# Patient Record
Sex: FEMALE | Race: BLACK OR AFRICAN AMERICAN | Employment: PART TIME | URBAN - METROPOLITAN AREA
[De-identification: names, ages, dates, MRNs, and addresses within clinical notes are randomized per-mention and may not be internally consistent; named-entity substitution may affect disease eponyms.]

---

## 2018-02-15 ENCOUNTER — APPOINTMENT (OUTPATIENT)
Dept: ULTRASOUND IMAGING | Age: 23
End: 2018-02-15
Attending: NURSE PRACTITIONER
Payer: SELF-PAY

## 2018-02-15 ENCOUNTER — HOSPITAL ENCOUNTER (EMERGENCY)
Age: 23
Discharge: HOME | End: 2018-02-15
Attending: EMERGENCY MEDICINE
Payer: SELF-PAY

## 2018-02-15 VITALS
WEIGHT: 205 LBS | OXYGEN SATURATION: 100 % | SYSTOLIC BLOOD PRESSURE: 115 MMHG | RESPIRATION RATE: 20 BRPM | BODY MASS INDEX: 31.63 KG/M2 | HEART RATE: 60 BPM | DIASTOLIC BLOOD PRESSURE: 65 MMHG | TEMPERATURE: 98.4 F

## 2018-02-15 DIAGNOSIS — N83.202 CYSTS OF BOTH OVARIES: Primary | ICD-10-CM

## 2018-02-15 DIAGNOSIS — N83.201 CYSTS OF BOTH OVARIES: Primary | ICD-10-CM

## 2018-02-15 LAB
APPEARANCE UR: CLEAR
BILIRUB UR QL: NEGATIVE
COLOR UR: YELLOW
GLUCOSE UR STRIP.AUTO-MCNC: NEGATIVE MG/DL
HCG UR QL: NEGATIVE
HGB UR QL STRIP: NEGATIVE
KETONES UR QL STRIP.AUTO: NEGATIVE MG/DL
LEUKOCYTE ESTERASE UR QL STRIP.AUTO: NEGATIVE
NITRITE UR QL STRIP.AUTO: NEGATIVE
PH UR STRIP: 6.5 [PH] (ref 5–8)
PROT UR STRIP-MCNC: NEGATIVE MG/DL
SERVICE CMNT-IMP: NORMAL
SP GR UR REFRACTOMETRY: 1.02 (ref 1–1.03)
UROBILINOGEN UR QL STRIP.AUTO: 0.2 EU/DL (ref 0.2–1)
WET PREP GENITAL: NORMAL

## 2018-02-15 PROCEDURE — 74011250637 HC RX REV CODE- 250/637: Performed by: NURSE PRACTITIONER

## 2018-02-15 PROCEDURE — 87491 CHLMYD TRACH DNA AMP PROBE: CPT | Performed by: NURSE PRACTITIONER

## 2018-02-15 PROCEDURE — 81025 URINE PREGNANCY TEST: CPT

## 2018-02-15 PROCEDURE — 76856 US EXAM PELVIC COMPLETE: CPT

## 2018-02-15 PROCEDURE — 81003 URINALYSIS AUTO W/O SCOPE: CPT | Performed by: NURSE PRACTITIONER

## 2018-02-15 PROCEDURE — 99284 EMERGENCY DEPT VISIT MOD MDM: CPT

## 2018-02-15 PROCEDURE — 87210 SMEAR WET MOUNT SALINE/INK: CPT | Performed by: NURSE PRACTITIONER

## 2018-02-15 RX ORDER — IBUPROFEN 600 MG/1
600 TABLET ORAL
Qty: 20 TAB | Refills: 0 | Status: SHIPPED | OUTPATIENT
Start: 2018-02-15

## 2018-02-15 RX ORDER — IBUPROFEN 600 MG/1
600 TABLET ORAL
Status: COMPLETED | OUTPATIENT
Start: 2018-02-15 | End: 2018-02-15

## 2018-02-15 RX ORDER — HYDROCODONE BITARTRATE AND ACETAMINOPHEN 5; 325 MG/1; MG/1
1 TABLET ORAL
Status: COMPLETED | OUTPATIENT
Start: 2018-02-15 | End: 2018-02-15

## 2018-02-15 RX ORDER — HYDROCODONE BITARTRATE AND ACETAMINOPHEN 5; 325 MG/1; MG/1
1 TABLET ORAL
Qty: 12 TAB | Refills: 0 | Status: SHIPPED | OUTPATIENT
Start: 2018-02-15

## 2018-02-15 RX ADMIN — HYDROCODONE BITARTRATE AND ACETAMINOPHEN 1 TABLET: 5; 325 TABLET ORAL at 21:02

## 2018-02-15 RX ADMIN — IBUPROFEN 600 MG: 600 TABLET, FILM COATED ORAL at 21:02

## 2018-02-15 NOTE — LETTER
[facility] EMERGENCY DEPT 
[facility] [address] [id number] 
[phone] Work/School Note Date: 2/15/2018 To Whom It May concern: 
 
Desma Galeazzi was seen and treated today in the emergency room by the following provider(s): 
Attending Provider: Jeanine Ford MD 
Nurse Practitioner: Darius Singh NP. Desma Galeazzi may return to work on 02/17/2018. Sincerely, Darius Singh NP

## 2018-02-15 NOTE — LETTER
[facility] EMERGENCY DEPT 
[facility] [phone] 
[phone] Work/School Note Date: 2/15/2018 To Whom It May concern: 
 
Javad Easley was seen and treated today in the emergency room by the following provider(s): 
Attending Provider: Clive Delcid MD 
Nurse Practitioner: Caterina Galvin NP. Javad Easley may return to work on 02/16/2018. Sincerely, Caterina Galvin NP 
 
 
 
 no blurred vision/no fever/no loss of consciousness/no nausea/no numbness/no syncope/no vomiting/no weakness/no change in level of consciousness/no chills

## 2018-02-16 LAB
C TRACH RRNA SPEC QL NAA+PROBE: NEGATIVE
N GONORRHOEA RRNA SPEC QL NAA+PROBE: NEGATIVE
SPECIMEN SOURCE: NORMAL

## 2018-02-16 NOTE — ED TRIAGE NOTES
C/o pelvic pain after having sex 1 hour ago. \"I think I have a cyst rupture cause this happened before. \"

## 2018-02-16 NOTE — DISCHARGE INSTRUCTIONS
Ruptured Ovarian Cyst: Care Instructions  Your Care Instructions    Sometimes a sac forms on the surface of a woman's ovary. When the sac swells up with fluid, it forms a cyst. If the cyst breaks open, it is called a ruptured ovarian cyst. Sometimes a cyst may rupture and then form again. Sometimes a cyst may partly break open. Blood and fluid can spill out into the lower belly and pelvis. You may not have symptoms from the cyst. But if it is large, or if it twists or bleeds, you may have pain or other problems. You may feel pain because the fluid irritates the pelvis. Your doctor may use a pelvic ultrasound to see if you have a cyst. Your doctor may also do blood tests. Treatment depends on your symptoms. If they are mild, your doctor may suggest carefully watching your symptoms. But if you have a cyst that is very large, bleeds a lot, or causes other problems, your doctor may suggest surgery to remove it. Follow-up care is a key part of your treatment and safety. Be sure to make and go to all appointments, and call your doctor if you are having problems. It's also a good idea to know your test results and keep a list of the medicines you take. How can you care for yourself at home? · Use heat, such as a warm water bottle, a heating pad set on low, or a warm bath, to relax tense muscles and relieve cramping. · Be safe with medicines. Read and follow all instructions on the label. ¨ If the doctor gave you a prescription medicine for pain, take it as prescribed. ¨ If you are not taking a prescription pain medicine, ask your doctor if you can take an over-the-counter medicine. When should you call for help? Call 911 anytime you think you may need emergency care. For example, call if:  ? · You passed out (lost consciousness). ?Call your doctor now or seek immediate medical care if:  ? · You have severe vaginal bleeding. ? · You are dizzy or lightheaded, or you feel like you may faint.    ? · You have new or worse pain in your belly or pelvis. ? Watch closely for changes in your health, and be sure to contact your doctor if:  ? · You think you may be pregnant. ? · You do not get better as expected. Where can you learn more? Go to http://jayden-heather.info/. Enter U942 in the search box to learn more about \"Ruptured Ovarian Cyst: Care Instructions. \"  Current as of: October 13, 2016  Content Version: 11.4  © 4035-5458 [employer]. Care instructions adapted under license by [employer] (which disclaims liability or warranty for this information). If you have questions about a medical condition or this instruction, always ask your healthcare professional. [address] any warranty or liability for your use of this information. Hemorrhagic Ovarian Cyst: Care Instructions  Your Care Instructions    Sometimes a sac forms on the surface of a woman's ovary. When the sac swells up with fluid, it forms a cyst. If the cyst bleeds, it is called a hemorrhagic (say \"eso-ncm-YF-jick\") ovarian cyst. If the cyst breaks open, blood and fluid spill out into the lower belly and pelvis. You may not have symptoms from the cyst. But if it is large, or if it twists or breaks open, you may have pain or other problems. You may feel pain from the cyst or have symptoms from losing blood. Your doctor may use a pelvic ultrasound to see if you have a cyst. Blood tests can help your doctor tell if the cyst is bleeding or you have lost a lot of blood. Treatment depends on your symptoms. If they are mild, your doctor may suggest carefully watching your symptoms and doing blood tests again. But if you have a cyst that is very large, bleeds a lot, or causes other problems, your doctor may suggest surgery to remove it. If the bleeding is heavy, you may also need treatment to replace the blood. Follow-up care is a key part of your treatment and safety.  Be sure to make and go to all appointments, and call your doctor if you are having problems. It's also a good idea to know your test results and keep a list of the medicines you take. How can you care for yourself at home? · Use heat, such as a warm water bottle, a heating pad set on low, or a warm bath, to relax tense muscles and relieve cramping. · Be safe with medicines. Read and follow all instructions on the label. ¨ If the doctor gave you a prescription medicine for pain, take it as prescribed. ¨ If you are not taking a prescription pain medicine, ask your doctor if you can take an over-the-counter medicine. When should you call for help? Call 911 anytime you think you may need emergency care. For example, call if:  ? · You passed out (lost consciousness). ?Call your doctor now or seek immediate medical care if:  ? · You have severe vaginal bleeding. ? · You are dizzy or lightheaded, or you feel like you may faint. ? · You have new or worse pain in your belly or pelvis. ? Watch closely for changes in your health, and be sure to contact your doctor if:  ? · You think you may be pregnant. ? · You do not get better as expected. Where can you learn more? Go to http://jayden-heather.info/. Enter D256 in the search box to learn more about \"Hemorrhagic Ovarian Cyst: Care Instructions. \"  Current as of: October 13, 2016  Content Version: 11.4  © 6582-6678 [employer]. Care instructions adapted under license by [employer] (which disclaims liability or warranty for this information). If you have questions about a medical condition or this instruction, always ask your healthcare professional. [address] any warranty or liability for your use of this information.

## 2018-02-16 NOTE — ED PROVIDER NOTES
HPI Comments: 7:55 PM   20 y.o. female presents to ED C/O pelvic pain. Patient has a HX of hypothyroid and ovarian cyst.  Patient reports sudden onset of pelvic pain 1 hour prior to arrival after having intercrosue. atient took motrin which didn't help, constant ache with intermittent sharp pelvic pain which cause patient to feel nauseas. Patient reports pelvic pain is worse with movement as well.   patient denies radiation of pain, vag bleeding, fever, vag discharge, CP, SOB. Patient reports similar pain in the past with ruptured cyst.  Patient is a nonsmoker/      LMP 02/07. Patient denies any other symptoms or complaints. The history is provided by the patient. History limited by: no language barrier. Past Medical History:   Diagnosis Date    Ill-defined condition     hypothyroid       History reviewed. No pertinent surgical history. History reviewed. No pertinent family history. Social History     Social History    Marital status: SINGLE     Spouse name: N/A    Number of children: N/A    Years of education: N/A     Occupational History    Not on file. Social History Main Topics    Smoking status: Never Smoker    Smokeless tobacco: Not on file    Alcohol use Yes      Comment: occ    Drug use: No    Sexual activity: Not on file     Other Topics Concern    Not on file     Social History Narrative         ALLERGIES: Review of patient's allergies indicates no known allergies. Review of Systems   Constitutional: Negative for appetite change and fever. HENT: Negative for congestion, rhinorrhea and sore throat. Respiratory: Negative for cough, shortness of breath and wheezing. Cardiovascular: Negative for chest pain and leg swelling. Gastrointestinal: Positive for abdominal pain and nausea. Negative for constipation, diarrhea and vomiting. Genitourinary: Positive for pelvic pain. Negative for dysuria. Musculoskeletal: Negative for arthralgias and back pain. Neurological: Negative for dizziness, syncope and headaches. All other systems reviewed and are negative. Vitals:    02/15/18 1954   BP: 135/86   Pulse: 69   Resp: 16   Temp: 98.4 °F (36.9 °C)   SpO2: 97%   Weight: 93 kg (205 lb)            Physical Exam   Constitutional: She is oriented to person, place, and time. She appears well-developed and well-nourished. Patient appears uncomfortable. HENT:   Head: Atraumatic. Eyes: Conjunctivae and EOM are normal.   Cardiovascular: Normal rate, regular rhythm and normal heart sounds. Pulmonary/Chest: Effort normal and breath sounds normal. No respiratory distress. She has no wheezes. She has no rales. Abdominal: Normal appearance and bowel sounds are normal. There is tenderness in the right lower quadrant, suprapubic area and left lower quadrant. There is no rigidity, no rebound, no guarding and no CVA tenderness. Genitourinary:   Genitourinary Comments: Please see pelvic exam   Musculoskeletal: Normal range of motion. Neurological: She is alert and oriented to person, place, and time. She exhibits normal muscle tone. Coordination normal.   Skin: Skin is warm and dry. No rash noted. She is not diaphoretic. No erythema. No pallor. Nursing note and vitals reviewed.        MDM  Number of Diagnoses or Management Options  Cysts of both ovaries:   Diagnosis management comments: Differential Diagnosis: appendicitis, ectopic pregnancy, normal pregnancy, TOA, PID, ovarian cyst, endometriosis, endometritis, uterine fibroids, constipation, gastroenteritis, IBS, IBD, celiac disease, diverticulitis, nephrolithiasis, pyelonephritis, cystitis, mesenteric ischemia, mesenteric adenitis, ruptured AAA, SBO, LBO    Plan:  UA, HCG, pelvic exam, pelvic ultrasound --concern for torsion due to worsening intermittent pain, sudden onset, adnexal pain on exam.   -No abnormal UA, HCG, or wet prep findings  Ultrasound - IMPRESSION:     No torsion     Simple cyst the right ovary and a slightly complex cyst the left ovary  suggesting a hemorrhagic cyst. Six-week follow-up ultrasound recommended.     Moderate amount of slightly complex free fluid in the pelvis suggesting a  ruptured cyst  9:45 PM Patient informed of results, evidence of probable cyst rupture and bilateral ovarian cyst noted with no evidence of torsion. Patient referred to [facility] for repeat ultrasound and follow-up, will discharge with short course of pain medication. Patient educated to return to the ED for any new or worsneing symptoms. QUestions deneid. Amount and/or Complexity of Data Reviewed  Clinical lab tests: ordered and reviewed  Tests in the radiology section of CPT®: ordered and reviewed          ED Course       Pelvic Exam  Date/Time: 2/15/2018 8:25 PM  Performed by: YARI  Chaperone: Temo Fitzgerald RN. Type of exam performed: bimanual and speculum. External genitalia appearance: normal.    Vaginal exam:  discharge. The amount of discharge was:  mild. The discharge was white. Cervical exam:  no cervical motion tenderness. Specimen(s) collected:  chlamydia, GC and vaginal culture. Bimanual exam:  right adenexal tenderness.                              RESULTS:    US PELV NON OB W TV   Final Result          Labs Reviewed   WET PREP   URINALYSIS W/ RFLX MICROSCOPIC   CHLAMYDIA/NEISSERIA AMPLIFICATION   HCG URINE, QL. - POC   POC URINE PREGNANCY TEST       Recent Results (from the past 12 hour(s))   WET PREP    Collection Time: 02/15/18  8:04 PM   Result Value Ref Range    Special Requests: NO SPECIAL REQUESTS      Wet prep NO YEAST,TRICHOMONAS OR CLUE CELLS NOTED     URINALYSIS W/ RFLX MICROSCOPIC    Collection Time: 02/15/18  8:09 PM   Result Value Ref Range    Color YELLOW      Appearance CLEAR      Specific gravity 1.023 1.005 - 1.030      pH (UA) 6.5 5.0 - 8.0      Protein NEGATIVE  NEG mg/dL    Glucose NEGATIVE  NEG mg/dL    Ketone NEGATIVE  NEG mg/dL    Bilirubin NEGATIVE  NEG      Blood NEGATIVE NEG      Urobilinogen 0.2 0.2 - 1.0 EU/dL    Nitrites NEGATIVE  NEG      Leukocyte Esterase NEGATIVE  NEG     HCG URINE, QL. - POC    Collection Time: 02/15/18  8:22 PM   Result Value Ref Range    Pregnancy test,urine (POC) NEGATIVE  NEG         PROGRESS NOTE:   7:55 PM  Initial assessment completed. Written by Gin Galloway NP-C    DISCHARGE NOTE:  9:49 PM   Josie Stover  results have been reviewed with her. She has been counseled regarding her diagnosis, treatment, and plan. She verbally conveys understanding and agreement of the signs, symptoms, diagnosis, treatment and prognosis and additionally agrees to follow up as discussed. She also agrees with the care-plan and conveys that all of her questions have been answered. I have also provided discharge instructions for her that include: educational information regarding their diagnosis and treatment, and list of reasons why they would want to return to the ED prior to their follow-up appointment, should her condition change. CLINICAL IMPRESSION:    1. Cysts of both ovaries        AFTER VISIT PLAN:    Current Discharge Medication List      START taking these medications    Details   ibuprofen (MOTRIN) 600 mg tablet Take 1 Tab by mouth every six (6) hours as needed for Pain. Qty: 20 Tab, Refills: 0      HYDROcodone-acetaminophen (NORCO) 5-325 mg per tablet Take 1 Tab by mouth every six (6) hours as needed for Pain. Max Daily Amount: 4 Tabs.   Qty: 12 Tab, Refills: 0    Associated Diagnoses: Cysts of both ovaries              Follow-up Information     Follow up With Details Comments Contact Info    Jacob Jenkins MD Schedule an appointment as soon as possible for a visit in 1 week Further evaluation [address] [place] [address] Schedule an appointment as soon as possible for a visit in 1 week As needed Denny Kerr [address] [place] [phone]  [phone]           Written by Altagracia TOLLIVER

## 2018-02-16 NOTE — ED NOTES
I was personally available for consultation in the emergency department. I have not seen the patient personally. I have reviewed the chart, treatment plan and disposition prior to discharge.      Janae White MD